# Patient Record
Sex: FEMALE | NOT HISPANIC OR LATINO | Employment: FULL TIME | URBAN - METROPOLITAN AREA
[De-identification: names, ages, dates, MRNs, and addresses within clinical notes are randomized per-mention and may not be internally consistent; named-entity substitution may affect disease eponyms.]

---

## 2020-09-09 NOTE — PROGRESS NOTES
Assessment/Plan:    Discussed TIFFANIE at length with options of consistent kegels, PT and surgical intervention  Patient will consider her options, perform consistent kegels and call the office for visit with Dr Brandon Leblanc if she would like to proceed with surgery  Recommended monthly SBE, annual CBE and annual screening mammo  ASCCP guidelines reviewed and pap with cotesting done today; this low risk patient was advised she meets criteria to d/c pap screening at age 71  Colooguard script completed  Reviewed diet/activity recommendations Calcium 4055-1534 mg and Vit D 600-1000 IU daily  Discussed postmenopausal considerations and symptoms to report  RTO in one year for routine annual gyn exam or sooner PRN  Diagnoses and all orders for this visit:    Encounter for gynecological examination (general) (routine) without abnormal findings    Screening mammogram, encounter for  -     Mammo screening bilateral w 3d & cad; Future    TIFFANIE (stress urinary incontinence, female)        Subjective:      Patient ID: Jamar Cheema is a 63 y o  female  This patient presents for new patient annual gyn exam   She has a hx of TIFFANIE that has been worsening and occurs with every sneeze  She is a runner and notices sx while running, although not as much because of adaptations she has made  She denies UUI  She denies  bleeding or spotting, VM sx, pelvic pain, breast concerns, abnormal discharge, bowel dysfunction, depression/anx  , not sexually active  Patient's  is on many medications for major depression  Pap/HPV done in 2017  Mammography done 8/24/18  Colonoscopy, not done to date  Patient does not like to undergo anesthesia          The following portions of the patient's history were reviewed and updated as appropriate: allergies, current medications, past family history, past medical history, past social history, past surgical history and problem list     Review of Systems   Constitutional: Negative  Respiratory: Negative  Cardiovascular: Negative  Gastrointestinal: Negative  Endocrine: Negative  Genitourinary: Negative for dysuria, frequency, pelvic pain, urgency, vaginal bleeding, vaginal discharge and vaginal pain  Musculoskeletal: Negative  Skin: Negative  Neurological: Negative  Psychiatric/Behavioral: Negative  Objective:      /64 (BP Location: Left arm)   Pulse 64   Ht 5' 2" (1 575 m)   Wt 64 7 kg (142 lb 9 6 oz)   LMP 09/03/2020   BMI 26 08 kg/m²          Physical Exam  Vitals signs and nursing note reviewed  Exam conducted with a chaperone present  Constitutional:       Appearance: Normal appearance  She is well-developed  HENT:      Head: Normocephalic and atraumatic  Neck:      Musculoskeletal: Normal range of motion and neck supple  Thyroid: No thyroid mass or thyromegaly  Cardiovascular:      Rate and Rhythm: Normal rate and regular rhythm  Heart sounds: Normal heart sounds  Pulmonary:      Effort: Pulmonary effort is normal       Breath sounds: Normal breath sounds  Chest:      Breasts: Breasts are symmetrical          Right: No inverted nipple, mass, nipple discharge, skin change or tenderness  Left: No inverted nipple, mass, nipple discharge, skin change or tenderness  Abdominal:      General: Bowel sounds are normal       Palpations: Abdomen is soft  Tenderness: There is no abdominal tenderness  Hernia: There is no hernia in the left inguinal area or right inguinal area  Genitourinary:     General: Normal vulva  Exam position: Supine  Pubic Area: No rash  Labia:         Right: No rash, tenderness, lesion or injury  Left: No rash, tenderness, lesion or injury  Urethra: No prolapse, urethral pain, urethral swelling or urethral lesion  Vagina: Normal  No signs of injury and foreign body   No vaginal discharge, erythema, tenderness, bleeding, lesions or prolapsed vaginal walls  Cervix: No cervical motion tenderness, discharge, friability, lesion, erythema, cervical bleeding or eversion  Uterus: Not deviated, not enlarged, not fixed, not tender and no uterine prolapse  Adnexa:         Right: No mass, tenderness or fullness  Left: No mass, tenderness or fullness  Rectum: No external hemorrhoid  Comments: Urethra normal without lesions  No bladder tenderness   No leak on supine cough stress test, had just emptied bladder  Musculoskeletal: Normal range of motion  Lymphadenopathy:      Lower Body: No right inguinal adenopathy  No left inguinal adenopathy  Skin:     General: Skin is warm and dry  Neurological:      Mental Status: She is alert and oriented to person, place, and time  Psychiatric:         Speech: Speech normal          Behavior: Behavior normal  Behavior is cooperative

## 2020-09-11 ENCOUNTER — OFFICE VISIT (OUTPATIENT)
Dept: GYNECOLOGY | Facility: CLINIC | Age: 55
End: 2020-09-11
Payer: COMMERCIAL

## 2020-09-11 VITALS
HEIGHT: 62 IN | SYSTOLIC BLOOD PRESSURE: 110 MMHG | BODY MASS INDEX: 26.24 KG/M2 | HEART RATE: 64 BPM | DIASTOLIC BLOOD PRESSURE: 64 MMHG | WEIGHT: 142.6 LBS

## 2020-09-11 DIAGNOSIS — Z12.4 ENCOUNTER FOR PAPANICOLAOU SMEAR FOR CERVICAL CANCER SCREENING: ICD-10-CM

## 2020-09-11 DIAGNOSIS — Z01.419 ENCOUNTER FOR GYNECOLOGICAL EXAMINATION (GENERAL) (ROUTINE) WITHOUT ABNORMAL FINDINGS: Primary | ICD-10-CM

## 2020-09-11 DIAGNOSIS — N39.3 SUI (STRESS URINARY INCONTINENCE, FEMALE): ICD-10-CM

## 2020-09-11 DIAGNOSIS — Z12.31 SCREENING MAMMOGRAM, ENCOUNTER FOR: ICD-10-CM

## 2020-09-11 PROCEDURE — 99386 PREV VISIT NEW AGE 40-64: CPT | Performed by: OBSTETRICS & GYNECOLOGY

## 2020-09-11 RX ORDER — FLUTICASONE PROPIONATE 50 MCG
1 SPRAY, SUSPENSION (ML) NASAL DAILY
COMMUNITY

## 2020-09-11 RX ORDER — DIPHENOXYLATE HYDROCHLORIDE AND ATROPINE SULFATE 2.5; .025 MG/1; MG/1
1 TABLET ORAL DAILY
COMMUNITY

## 2020-09-11 NOTE — PATIENT INSTRUCTIONS
Discussed TIFFANIE at length with options of consistent kegels, PT and surgical intervention  Patient will consider her options, perform consistent kegels and call the office for visit with Dr Gabino Loja if she would like to proceed with surgery  Recommended monthly SBE, annual CBE and annual screening mammo  ASCCP guidelines reviewed and pap with cotesting done today; this low risk patient was advised she meets criteria to d/c pap screening at age 71  Colooguard script completed  Reviewed diet/activity recommendations Calcium 0614-2301 mg and Vit D 600-1000 IU daily  Discussed postmenopausal considerations and symptoms to report  RTO in one year for routine annual gyn exam or sooner PRN

## 2020-09-15 LAB
CYTOLOGIST CVX/VAG CYTO: NORMAL
DX ICD CODE: NORMAL
HPV I/H RISK 1 DNA CVX QL PROBE+SIG AMP: NEGATIVE
OTHER STN SPEC: NORMAL
PATH REPORT.FINAL DX SPEC: NORMAL
SL AMB NOTE:: NORMAL
SL AMB SPECIMEN ADEQUACY: NORMAL

## 2020-10-26 ENCOUNTER — NURSE TRIAGE (OUTPATIENT)
Dept: OTHER | Facility: OTHER | Age: 55
End: 2020-10-26

## 2020-10-26 DIAGNOSIS — Z20.822 ENCOUNTER FOR SCREENING LABORATORY TESTING FOR COVID-19 VIRUS: Primary | ICD-10-CM

## 2020-10-26 DIAGNOSIS — Z20.822 ENCOUNTER FOR SCREENING LABORATORY TESTING FOR COVID-19 VIRUS: ICD-10-CM

## 2020-10-26 PROCEDURE — U0003 INFECTIOUS AGENT DETECTION BY NUCLEIC ACID (DNA OR RNA); SEVERE ACUTE RESPIRATORY SYNDROME CORONAVIRUS 2 (SARS-COV-2) (CORONAVIRUS DISEASE [COVID-19]), AMPLIFIED PROBE TECHNIQUE, MAKING USE OF HIGH THROUGHPUT TECHNOLOGIES AS DESCRIBED BY CMS-2020-01-R: HCPCS | Performed by: FAMILY MEDICINE

## 2020-10-27 LAB — SARS-COV-2 RNA SPEC QL NAA+PROBE: NOT DETECTED

## 2020-10-28 ENCOUNTER — TELEPHONE (OUTPATIENT)
Dept: OTHER | Facility: OTHER | Age: 55
End: 2020-10-28

## 2020-10-29 ENCOUNTER — TELEPHONE (OUTPATIENT)
Dept: OTHER | Facility: OTHER | Age: 55
End: 2020-10-29

## 2020-12-06 ENCOUNTER — HOSPITAL ENCOUNTER (OUTPATIENT)
Dept: MAMMOGRAPHY | Facility: IMAGING CENTER | Age: 55
Discharge: HOME | End: 2020-12-06
Payer: COMMERCIAL

## 2020-12-06 VITALS — BODY MASS INDEX: 23.92 KG/M2 | WEIGHT: 135 LBS | HEIGHT: 63 IN

## 2020-12-06 DIAGNOSIS — Z12.31 SCREENING MAMMOGRAM, ENCOUNTER FOR: ICD-10-CM

## 2020-12-06 PROCEDURE — 77063 BREAST TOMOSYNTHESIS BI: CPT

## 2020-12-06 PROCEDURE — 77067 SCR MAMMO BI INCL CAD: CPT

## 2023-04-03 ENCOUNTER — OFFICE VISIT (OUTPATIENT)
Dept: FAMILY MEDICINE CLINIC | Facility: CLINIC | Age: 58
End: 2023-04-03

## 2023-04-03 VITALS
HEIGHT: 63 IN | DIASTOLIC BLOOD PRESSURE: 78 MMHG | SYSTOLIC BLOOD PRESSURE: 122 MMHG | OXYGEN SATURATION: 98 % | HEART RATE: 64 BPM | WEIGHT: 157.25 LBS | BODY MASS INDEX: 27.86 KG/M2 | TEMPERATURE: 97.4 F

## 2023-04-03 DIAGNOSIS — R09.89 ABNORMAL LUNG SOUNDS: ICD-10-CM

## 2023-04-03 DIAGNOSIS — Z23 NEED FOR COVID-19 VACCINE: ICD-10-CM

## 2023-04-03 DIAGNOSIS — Z86.16 HISTORY OF COVID-19: ICD-10-CM

## 2023-04-03 DIAGNOSIS — F41.8 ANXIETY WITH DEPRESSION: Primary | ICD-10-CM

## 2023-04-03 DIAGNOSIS — Z11.4 SCREENING FOR HIV (HUMAN IMMUNODEFICIENCY VIRUS): ICD-10-CM

## 2023-04-03 DIAGNOSIS — R23.2 HOT FLASHES: ICD-10-CM

## 2023-04-03 DIAGNOSIS — G43.009 MIGRAINE WITHOUT AURA AND WITHOUT STATUS MIGRAINOSUS, NOT INTRACTABLE: ICD-10-CM

## 2023-04-03 DIAGNOSIS — Z13.1 SCREENING FOR DIABETES MELLITUS (DM): ICD-10-CM

## 2023-04-03 DIAGNOSIS — J30.9 ALLERGIC RHINITIS, UNSPECIFIED SEASONALITY, UNSPECIFIED TRIGGER: ICD-10-CM

## 2023-04-03 DIAGNOSIS — R63.5 WEIGHT GAIN: ICD-10-CM

## 2023-04-03 DIAGNOSIS — Z12.11 COLON CANCER SCREENING: ICD-10-CM

## 2023-04-03 DIAGNOSIS — Z13.220 LIPID SCREENING: ICD-10-CM

## 2023-04-03 DIAGNOSIS — Z12.31 ENCOUNTER FOR SCREENING MAMMOGRAM FOR MALIGNANT NEOPLASM OF BREAST: ICD-10-CM

## 2023-04-03 DIAGNOSIS — Z11.59 NEED FOR HEPATITIS C SCREENING TEST: ICD-10-CM

## 2023-04-03 PROBLEM — Z86.69 HISTORY OF CHOLESTEATOMA: Status: ACTIVE | Noted: 2018-04-05

## 2023-04-03 PROBLEM — H90.12 CONDUCTIVE HEARING LOSS OF LEFT EAR: Status: ACTIVE | Noted: 2023-04-03

## 2023-04-03 RX ORDER — FLUOXETINE HYDROCHLORIDE 20 MG/1
20 CAPSULE ORAL DAILY
Qty: 90 CAPSULE | Refills: 3 | Status: SHIPPED | OUTPATIENT
Start: 2023-04-03

## 2023-04-03 RX ORDER — RIZATRIPTAN BENZOATE 10 MG/1
TABLET, ORALLY DISINTEGRATING ORAL
COMMUNITY

## 2023-04-03 NOTE — ASSESSMENT & PLAN NOTE
Patient with history of COVID in November presenting with decrease in energy and abnormal left posterior lung fields    Check chest x-ray and pulmonary function test

## 2023-04-03 NOTE — PROGRESS NOTES
Name: Thi Gee      : 1965      MRN: [id number]  Encounter Provider: Flor Rey PA-C  Encounter Date: 4/3/2023   Encounter department: [facility]    Assessment & Plan     1  Anxiety with depression  Assessment & Plan:  PT would like to retry Prozac 20 mg  Recheck in 2 months  Orders:  -     FLUoxetine (PROzac) 20 mg capsule; Take 1 capsule (20 mg total) by mouth daily    2  Migraine without aura and without status migrainosus, not intractable  Assessment & Plan:  NO longer uses Maxalt as very infrequent  3  Allergic rhinitis, unspecified seasonality, unspecified trigger  Assessment & Plan:  Stable on antihistamine and flonase as needed  4  Need for hepatitis C screening test  -     Hepatitis C Antibody ([facility]); Future    5  Screening for HIV (human immunodeficiency virus)  -     HIV 1/2 AG/AB w Reflex [facility] for 2 yr old and above; Future    6  Lipid screening  -     Lipid panel    7  Screening for diabetes mellitus (DM)  -     Comprehensive metabolic panel    8  Weight gain  Assessment & Plan:  Check fasting labs  Orders:  -     Comprehensive metabolic panel  -     TSH, 3rd generation with Free T4 reflex  -     CBC and differential    9  Hot flashes  -     FSH and LH; Future  -     FLUoxetine (PROzac) 20 mg capsule; Take 1 capsule (20 mg total) by mouth daily    10  Abnormal lung sounds  Assessment & Plan:  Patient with history of COVID in November presenting with decrease in energy and abnormal left posterior lung fields  Check chest x-ray and pulmonary function test     Orders:  -     XR chest pa & lateral; Future; Expected date: 2023  -     Complete PFT with post bronchodilator; Future    11  History of COVID-19  -     XR chest pa & lateral; Future; Expected date: 2023  -     Complete PFT with post bronchodilator; Future    12  Colon cancer screening  -     Ambulatory referral for colonoscopy; Future    13   Encounter for screening mammogram for malignant neoplasm of breast  -     Mammo screening bilateral w 3d & cad; Future; Expected date: 04/03/2023    14  Need for COVID-19 vaccine  -     Age 15 y+, BOOSTER (BIVALENT): CTSSW-28 LDHZEY vac bivalent candelaria-sucr      BMI Counseling: Body mass index is 28 3 kg/m²  The BMI is above normal  Nutrition recommendations include decreasing portion sizes, encouraging healthy choices of fruits and vegetables, decreasing fast food intake, consuming healthier snacks, limiting drinks that contain sugar, moderation in carbohydrate intake, increasing intake of lean protein, reducing intake of saturated and trans fat and reducing intake of cholesterol  Exercise recommendations include exercising 3-5 times per week  No pharmacotherapy was ordered  Rationale for BMI follow-up plan is due to patient being overweight or obese  Subjective      Patient here to be established last provider seen was [facility] 2021  Blood work was done at that time and within normal limits except for [facility] in menopausal range  Last mammogram 2020  She was seeing 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  Patient does have a GYN evaluation set up  Also patient has a history of being on Prozac titrated up to 40 mg in the past due to perimenopausal symptoms and it worked very well but then she ended up weaning herself off at this point time she would like to put herself back on this lowest dose    She did not "have a history of weight gain within the past     Patient is interested in getting the shingles vaccine and does need to check her insurance company and is also interested in getting a 5 daily since has been 3 months since her COVID infection  Review of Systems   Constitutional: Negative  HENT: Negative  Eyes: Negative  Respiratory: Negative  Cardiovascular: Negative  Gastrointestinal: Negative  Endocrine: Negative  Genitourinary: Negative  Musculoskeletal: Negative  Skin: Negative  Allergic/Immunologic: Negative  Neurological: Negative  Hematological: Negative  Psychiatric/Behavioral: Negative  Current Outpatient Medications on File Prior to Visit   Medication Sig   • multivitamin (THERAGRAN) TABS Take 1 tablet by mouth daily   • VITAMIN D PO Take by mouth   • Fexofenadine HCl (ALLEGRA PO) Take 10 mg by mouth daily   • fluticasone (FLONASE) 50 mcg/act nasal spray 1 spray into each nostril daily   • rizatriptan (MAXALT-MLT) 10 mg disintegrating tablet Take by mouth       Objective     /78 (BP Location: Right arm, Patient Position: Sitting, Cuff Size: Standard)   Pulse 64   Temp (!) 97 4 °F (36 3 °C) (Temporal)   Ht 5' 2 5\" (1 588 m)   Wt 71 3 kg (157 lb 4 oz)   SpO2 98%   BMI 28 30 kg/m²     Physical Exam  Vitals and nursing note reviewed  Constitutional:       General: She is not in acute distress  Appearance: She is well-developed  She is not diaphoretic  HENT:      Head: Normocephalic and atraumatic  Eyes:      General:         Right eye: No discharge  Left eye: No discharge  Conjunctiva/sclera: Conjunctivae normal    Neck:      Vascular: No carotid bruit  Cardiovascular:      Rate and Rhythm: Normal rate and regular rhythm  Heart sounds: Normal heart sounds  No murmur heard  No friction rub  No gallop  Pulmonary:      Effort: Pulmonary effort is normal  No respiratory distress        Breath sounds: Examination of the " left-upper field reveals wheezing  Examination of the left-middle field reveals wheezing  Examination of the left-lower field reveals wheezing  Wheezing present  No rales  Comments: End inspiratory wheeze left posterior lung fields only  Musculoskeletal:      Cervical back: Neck supple  Skin:     General: Skin is warm and dry  Neurological:      Mental Status: She is alert and oriented to person, place, and time     Psychiatric:         Judgment: Judgment normal        Lexine Koyanagi, PA-C

## 2023-05-02 NOTE — PROGRESS NOTES
AMB US Pelvic Non OB    Date/Time: 5/3/2023 3:00 PM  Performed by: Emily Jameson  Authorized by: Boris Moy DO   Universal Protocol:  Patient identity confirmed: verbally with patient      Procedure details:     Technique:  Transvaginal US, Non-OB    Position: lithotomy exam    Uterine findings:     Length (cm): 5 57    Height (cm):  2 87    Width (cm):  3 87    Endometrial stripe: identified      Endometrium thickness (mm):  2 45  Left ovary findings:     Left ovary:  Visualized    Length (cm): 1 9    Height (cm): 1 09    Width (cm): 1 25  Right ovary findings:     Right ovary:  Visualized    Length (cm): 2 31    Height (cm): 1 05    Width (cm): 1 69  Other findings:     Free pelvic fluid: not identified      Free peritoneal fluid: not identified    Post-Procedure Details:     Impression:  Anteverted uterus and bilateral ovaries appear within normal limits  No free fluid  Tolerance: Tolerated well, no immediate complications    Complications: no complications    Additional Procedure Comments:      GE Voluson P8 transvaginal transducer RIC5-RA with Serial Number [id number] was used during procedure and subsequently cleaned with high level disinfection utilizing the [employer]       Ultrasound performed at:      for [address]  [address], [address]  Phone: [phone]  Fax:  [phone]

## 2023-05-03 ENCOUNTER — OFFICE VISIT (OUTPATIENT)
Dept: GYNECOLOGY | Facility: CLINIC | Age: 58
End: 2023-05-03

## 2023-05-03 ENCOUNTER — ULTRASOUND (OUTPATIENT)
Dept: GYNECOLOGY | Facility: CLINIC | Age: 58
End: 2023-05-03

## 2023-05-03 DIAGNOSIS — N95.0 PMB (POSTMENOPAUSAL BLEEDING): Primary | ICD-10-CM

## 2023-05-03 NOTE — PROGRESS NOTES
Patient was seen in the office on April 12 with Keren Calixto for annual examination  Patient stated she had a history of stress urinary incontinence that has progressively become worse  She denies any frequency or dysuria or hematuria  She also reported an episode of bleeding in Christmas 2022  She has had no further bleeding since then  She was advised to return to the office for transvaginal scan possible saline infusion hysterosonography possible biopsy       Date/Time: 5/3/2023 3:00 PM  Performed by: Michelle Gipson  Authorized by: Rivera Marc DO   Universal Protocol:  Patient identity confirmed: verbally with patient        Procedure details:     Technique:  Transvaginal US, Non-OB    Position: lithotomy exam    Uterine findings:     Length (cm): 5 57    Height (cm):  2 87    Width (cm):  3 87    Endometrial stripe: identified      Endometrium thickness (mm):  2 45  Left ovary findings:     Left ovary:  Visualized    Length (cm): 1 9    Height (cm): 1 09    Width (cm): 1 25  Right ovary findings:     Right ovary:  Visualized    Length (cm): 2 31    Height (cm): 1 05    Width (cm): 1 69  Other findings:     Free pelvic fluid: not identified      Free peritoneal fluid: not identified    Post-Procedure Details:     Impression:  Anteverted uterus and bilateral ovaries appear within normal limits  No free fluid  Tolerance: Tolerated well, no immediate complications    Complications: no complications    Additional Procedure Comments:       Impression: Postmenopausal bleeding  Endometrium less than 3 mm therefore no biopsy nor saline infusion was performed       TIFFANIE    Plan: Patient will return to the office to further discuss treatment options for TIFFANIE

## 2023-05-11 ENCOUNTER — HOSPITAL ENCOUNTER (OUTPATIENT)
Dept: MAMMOGRAPHY | Facility: MEDICAL CENTER | Age: 58
Discharge: HOME | End: 2023-05-11

## 2023-05-11 VITALS — BODY MASS INDEX: 27.66 KG/M2 | WEIGHT: 156.09 LBS | HEIGHT: 63 IN

## 2023-05-11 DIAGNOSIS — Z12.31 ENCOUNTER FOR SCREENING MAMMOGRAM FOR MALIGNANT NEOPLASM OF BREAST: ICD-10-CM

## 2023-05-19 ENCOUNTER — OFFICE VISIT (OUTPATIENT)
Dept: GASTROENTEROLOGY | Facility: MEDICAL CENTER | Age: 58
End: 2023-05-19

## 2023-05-19 VITALS
TEMPERATURE: 98.2 F | DIASTOLIC BLOOD PRESSURE: 76 MMHG | WEIGHT: 155.2 LBS | SYSTOLIC BLOOD PRESSURE: 130 MMHG | HEART RATE: 68 BPM | HEIGHT: 63 IN | BODY MASS INDEX: 27.5 KG/M2

## 2023-05-19 DIAGNOSIS — Z12.11 COLON CANCER SCREENING: ICD-10-CM

## 2023-05-19 RX ORDER — SODIUM, POTASSIUM,MAG SULFATES 17.5-3.13G
177 SOLUTION, RECONSTITUTED, ORAL ORAL SEE ADMIN INSTRUCTIONS
Qty: 354 ML | Refills: 0 | Status: SHIPPED | OUTPATIENT
Start: 2023-05-19

## 2023-05-19 NOTE — PATIENT INSTRUCTIONS
Scheduled date of Colonoscopy (as of today) 06/23/2023  Physician performing: Dr Coates Setting  Location of procedure:   [place]  Instructions given to patient:  Suprep  Clearances: n/a

## 2023-05-19 NOTE — PROGRESS NOTES
[address] [facility] - Outpatient Consultation  Sunshine Caal 60 y o  female MRN: [id number]  Encounter: [id number]      Assessment and Plan:    1  Colon cancer screening        60 y o  female without significant medical history referred to GI for initial screening colonoscopy  She is at average risk for CRC  She asked about Cologuard vs colonoscopy, and we discussed the risks and benefits for each  Ultimately, she will proceed with colonoscopy  - Colonoscopy, Suprep    Orders Placed This Encounter   Procedures   • Colonoscopy     ______________________________________________________________________    History of Present Illness:    Sunshine Caal is a 60 y o  female without significant medical history referred to GI for initial screening colonoscopy  Patient denies abdominal pain, diarrhea, constipation, hematochezia, melena, or unintentional weight loss  No family history of colon cancer  No prior colonoscopy but she had a negative Cologuard in 11/2020  Review of Systems:  As per HPI  Otherwise negative        Historical Information   Past Medical History:   Diagnosis Date   • Allergic      Past Surgical History:   Procedure Laterality Date   • TYMPANOPLASTY W/ MASTOIDECTOMY       Social History   Social History     Substance and Sexual Activity   Alcohol Use Yes    Comment: socially     Social History     Substance and Sexual Activity   Drug Use Never     Social History     Tobacco Use   Smoking Status Never   Smokeless Tobacco Never     Family History   Problem Relation Age of Onset   • Heart disease Father         triple bypass   • No Known Problems Sister    • No Known Problems Brother    • No Known Problems Mother    • No Known Problems Maternal Grandmother    • No Known Problems Maternal Grandfather    • No Known Problems Paternal Grandmother    • No Known Problems Paternal Grandfather    • No Known Problems Sister    • No Known Problems Sister    • No Known "Problems Sister    • No Known Problems Maternal Aunt    • No Known Problems Maternal Aunt    • No Known Problems Maternal Aunt    • No Known Problems Maternal Aunt        Meds/Allergies       Current Outpatient Medications:   •  Fexofenadine HCl (ALLEGRA PO)  •  FLUoxetine (PROzac) 20 mg capsule  •  multivitamin (THERAGRAN) TABS  •  Na Sulfate-K Sulfate-Mg Sulf (Suprep Bowel Prep Kit) 17 5-3 13-1 6 GM/177ML SOLN  •  VITAMIN D PO  •  fluticasone (FLONASE) 50 mcg/act nasal spray  •  rizatriptan (MAXALT-MLT) 10 mg disintegrating tablet    Allergies   Allergen Reactions   • Penicillins Rash   • Pollen Extract Allergic Rhinitis           Objective     Blood pressure 130/76, pulse 68, temperature 98 2 °F (36 8 °C), temperature source Tympanic, height 5' 2 5\" (1 588 m), weight 70 4 kg (155 lb 3 2 oz), last menstrual period 10/31/2020  Body mass index is 27 93 kg/m²          Physical Exam:      General: No acute distress  Abdomen: Soft, non-tender, non-distended, normoactive bowel sounds  Neuro: Awake, alert, oriented x 3    Lab Results:   No results found for: WBC, HGB, PLT, SODIUM, K, CL, CO2, BUN, CREATININE, AST, ALT, ALKPHOS, TBILI, BILIDIR, ALB, INR, LIPASE, FERRITIN, CONCFE, TIBC    "

## 2023-06-06 ENCOUNTER — TELEPHONE (OUTPATIENT)
Dept: GASTROENTEROLOGY | Facility: MEDICAL CENTER | Age: 58
End: 2023-06-06

## 2023-06-08 DIAGNOSIS — Z12.11 SCREENING FOR COLON CANCER: Primary | ICD-10-CM

## 2023-06-08 RX ORDER — BISACODYL 5 MG/1
TABLET, DELAYED RELEASE ORAL
Qty: 2 TABLET | Refills: 0 | Status: SHIPPED | OUTPATIENT
Start: 2023-06-08

## 2023-06-08 NOTE — TELEPHONE ENCOUNTER
I called and spoke with patient to confirm 6/23 procedure  Patient informed of prep medication sent to pharmacy and of prep instructions being sent to [facility]  Patient requested a different bowel prep medication as Suprep was too expensive at pharmacy

## 2023-06-21 RX ORDER — SODIUM CHLORIDE 9 MG/ML
125 INJECTION, SOLUTION INTRAVENOUS CONTINUOUS
Status: CANCELLED | OUTPATIENT
Start: 2023-06-21

## 2023-06-22 ENCOUNTER — ANESTHESIA (OUTPATIENT)
Dept: ANESTHESIOLOGY | Facility: HOSPITAL | Age: 58
End: 2023-06-22

## 2023-06-22 ENCOUNTER — ANESTHESIA EVENT (OUTPATIENT)
Dept: ANESTHESIOLOGY | Facility: HOSPITAL | Age: 58
End: 2023-06-22

## 2023-06-22 NOTE — ANESTHESIA PREPROCEDURE EVALUATION
Procedure:  PRE-OP ONLY    Relevant Problems   ANESTHESIA (within normal limits)      CARDIO (within normal limits)   (+) Migraine without aura and without status migrainosus, not intractable      ENDO (within normal limits)      GI/HEPATIC (within normal limits)      /RENAL (within normal limits)      GYN (within normal limits)      HEMATOLOGY (within normal limits)      MUSCULOSKELETAL (within normal limits)      NEURO/PSYCH   (+) Anxiety with depression   (+) Migraine without aura and without status migrainosus, not intractable      PULMONARY (within normal limits)             Anesthesia Plan  ASA Score- 2     Anesthesia Type- IV sedation with anesthesia with ASA Monitors  Additional Monitors:   Airway Plan:           Plan Factors-Exercise tolerance (METS): >4 METS  Chart reviewed  Patient summary reviewed  Patient is not a current smoker  Induction- intravenous  Postoperative Plan-     Informed Consent- Anesthetic plan and risks discussed with patient

## 2023-06-23 ENCOUNTER — ANESTHESIA (OUTPATIENT)
Dept: GASTROENTEROLOGY | Facility: MEDICAL CENTER | Age: 58
End: 2023-06-23

## 2023-06-23 ENCOUNTER — HOSPITAL ENCOUNTER (OUTPATIENT)
Dept: GASTROENTEROLOGY | Facility: MEDICAL CENTER | Age: 58
Setting detail: OUTPATIENT SURGERY
End: 2023-06-23
Payer: COMMERCIAL

## 2023-06-23 ENCOUNTER — ANESTHESIA EVENT (OUTPATIENT)
Dept: GASTROENTEROLOGY | Facility: MEDICAL CENTER | Age: 58
End: 2023-06-23

## 2023-06-23 VITALS
SYSTOLIC BLOOD PRESSURE: 111 MMHG | DIASTOLIC BLOOD PRESSURE: 57 MMHG | TEMPERATURE: 98.5 F | OXYGEN SATURATION: 98 % | RESPIRATION RATE: 16 BRPM | HEART RATE: 59 BPM

## 2023-06-23 DIAGNOSIS — Z12.11 COLON CANCER SCREENING: ICD-10-CM

## 2023-06-23 RX ORDER — PROPOFOL 10 MG/ML
INJECTION, EMULSION INTRAVENOUS AS NEEDED
Status: DISCONTINUED | OUTPATIENT
Start: 2023-06-23 | End: 2023-06-23

## 2023-06-23 RX ORDER — LIDOCAINE HYDROCHLORIDE 20 MG/ML
INJECTION, SOLUTION EPIDURAL; INFILTRATION; INTRACAUDAL; PERINEURAL AS NEEDED
Status: DISCONTINUED | OUTPATIENT
Start: 2023-06-23 | End: 2023-06-23

## 2023-06-23 RX ORDER — SODIUM CHLORIDE 9 MG/ML
125 INJECTION, SOLUTION INTRAVENOUS CONTINUOUS
Status: DISCONTINUED | OUTPATIENT
Start: 2023-06-23 | End: 2023-06-27 | Stop reason: HOSPADM

## 2023-06-23 RX ADMIN — PROPOFOL 100 MG: 10 INJECTION, EMULSION INTRAVENOUS at 12:21

## 2023-06-23 RX ADMIN — LIDOCAINE HYDROCHLORIDE 80 MG: 20 INJECTION, SOLUTION EPIDURAL; INFILTRATION; INTRACAUDAL at 12:21

## 2023-06-23 RX ADMIN — SODIUM CHLORIDE 125 ML/HR: 0.9 INJECTION, SOLUTION INTRAVENOUS at 11:55

## 2023-06-23 RX ADMIN — PROPOFOL 50 MG: 10 INJECTION, EMULSION INTRAVENOUS at 12:26

## 2023-06-23 NOTE — ANESTHESIA PREPROCEDURE EVALUATION
Procedure:  COLONOSCOPY    Relevant Problems   ANESTHESIA (within normal limits)      CARDIO (within normal limits)   (+) Migraine without aura and without status migrainosus, not intractable      ENDO (within normal limits)      GI/HEPATIC (within normal limits)      /RENAL (within normal limits)      GYN (within normal limits)      HEMATOLOGY (within normal limits)      MUSCULOSKELETAL (within normal limits)      NEURO/PSYCH   (+) Anxiety with depression   (+) Migraine without aura and without status migrainosus, not intractable      PULMONARY (within normal limits)        Physical Exam    Airway    Mallampati score: II  TM Distance: >3 FB  Neck ROM: full     Dental   No notable dental hx     Cardiovascular  Rhythm: regular, Rate: normal, Cardiovascular exam normal    Pulmonary  Pulmonary exam normal Breath sounds clear to auscultation,     Other Findings        Anesthesia Plan  ASA Score- 2     Anesthesia Type- IV sedation with anesthesia with ASA Monitors  Additional Monitors:   Airway Plan:           Plan Factors-Exercise tolerance (METS): >4 METS  Chart reviewed  Patient summary reviewed  Patient is not a current smoker  Induction- intravenous  Postoperative Plan-     Informed Consent- Anesthetic plan and risks discussed with patient

## 2023-06-23 NOTE — H&P
History and Physical -  [facility]  Leslie Monson 60 y o  female MRN: [id number]          HPI: Leslie Monson is a 60y o  year old female who presents for initial screening colonoscopy  No prior colonoscopy although Cologuard 11/20202 was negative  No family history of colon cancer  REVIEW OF SYSTEMS: Per the HPI, and otherwise unremarkable      Historical Information   Past Medical History:   Diagnosis Date   • Allergic      Past Surgical History:   Procedure Laterality Date   • TYMPANOPLASTY W/ MASTOIDECTOMY       Social History   Social History     Substance and Sexual Activity   Alcohol Use Yes    Comment: socially     Social History     Substance and Sexual Activity   Drug Use Never     Social History     Tobacco Use   Smoking Status Never   Smokeless Tobacco Never     Family History   Problem Relation Age of Onset   • Heart disease Father         triple bypass   • No Known Problems Sister    • No Known Problems Brother    • No Known Problems Mother    • No Known Problems Maternal Grandmother    • No Known Problems Maternal Grandfather    • No Known Problems Paternal Grandmother    • No Known Problems Paternal Grandfather    • No Known Problems Sister    • No Known Problems Sister    • No Known Problems Sister    • No Known Problems Maternal Aunt    • No Known Problems Maternal Aunt    • No Known Problems Maternal Aunt    • No Known Problems Maternal Aunt        Meds/Allergies       Current Outpatient Medications:   •  bisacodyl (DULCOLAX) 5 mg EC tablet  •  Fexofenadine HCl (ALLEGRA PO)  •  FLUoxetine (PROzac) 20 mg capsule  •  fluticasone (FLONASE) 50 mcg/act nasal spray  •  multivitamin (THERAGRAN) TABS  •  Na Sulfate-K Sulfate-Mg Sulf (Suprep Bowel Prep Kit) 17 5-3 13-1 6 GM/177ML SOLN  •  polyethylene glycol (GOLYTELY) 4000 mL solution  •  rizatriptan (MAXALT-MLT) 10 mg disintegrating tablet  •  VITAMIN D PO    Current Facility-Administered Medications:   •  sodium chloride 0 9 % infusion, 125 mL/hr, Intravenous, Continuous    Allergies   Allergen Reactions   • Penicillins Rash   • Pollen Extract Allergic Rhinitis       Objective     LMP 10/31/2020 (Approximate) Comment: skipping periods, denies pregnancy      PHYSICAL EXAM    GEN: NAD  CARDIO: RRR  PULM: CTA bilaterally  ABD: soft, non-tender, non-distended  EXT: no lower extremity edema  NEURO: AAOx3      ASSESSMENT/PLAN:  60y o  year old female here for colonoscopy; she is stable and optimized for her procedure

## 2023-06-23 NOTE — ANESTHESIA POSTPROCEDURE EVALUATION
Post-Op Assessment Note    CV Status:  Stable    Pain management: adequate     Mental Status:  Alert and awake   Hydration Status:  Euvolemic   PONV Controlled:  Controlled   Airway Patency:  Patent      Post Op Vitals Reviewed: Yes      Staff: Anesthesiologist         No notable events documented      /57 (06/23/23 1248)    Temp      Pulse 59 (06/23/23 1248)   Resp 16 (06/23/23 1248)    SpO2 98 % (06/23/23 1248)

## 2024-02-22 ENCOUNTER — OFFICE VISIT (OUTPATIENT)
Dept: URGENT CARE | Facility: CLINIC | Age: 59
End: 2024-02-22
Payer: COMMERCIAL

## 2024-02-22 VITALS
TEMPERATURE: 96.6 F | SYSTOLIC BLOOD PRESSURE: 142 MMHG | RESPIRATION RATE: 16 BRPM | OXYGEN SATURATION: 98 % | DIASTOLIC BLOOD PRESSURE: 74 MMHG | HEART RATE: 64 BPM

## 2024-02-22 DIAGNOSIS — H00.012 HORDEOLUM EXTERNUM OF RIGHT LOWER EYELID: Primary | ICD-10-CM

## 2024-02-22 PROCEDURE — 99213 OFFICE O/P EST LOW 20 MIN: CPT

## 2024-02-22 RX ORDER — ERYTHROMYCIN 5 MG/G
0.5 OINTMENT OPHTHALMIC
Qty: 3.5 G | Refills: 0 | Status: SHIPPED | OUTPATIENT
Start: 2024-02-22 | End: 2024-02-29

## 2024-02-22 NOTE — PATIENT INSTRUCTIONS
Warm soaks to the right eye 4-6 times a day  Use the eye ointment as prescribed  If your eyes not any better after the weekend follow-up with your ophthalmologist

## 2024-02-22 NOTE — PROGRESS NOTES
[facility]        NAME: Mirta Sierra is a 58 y.o. female  : 1965    MRN: [id number]  DATE: 2024  TIME: 8:39 AM    Assessment and Plan   Hordeolum externum of right lower eyelid [H00.012]  1. Hordeolum externum of right lower eyelid  erythromycin (ILOTYCIN) ophthalmic ointment            Patient Instructions   Warm soaks to the right eye 4-6 times a day  Use the eye ointment as prescribed  If your eyes not any better after the weekend follow-up with your ophthalmologist    Follow up with PCP in 3-5 days.  Proceed to  ER if symptoms worsen.    Chief Complaint     Chief Complaint   Patient presents with    Eye Pain     Pt reports right eye pain since this weekend. Feels like she has a stye and believes this was caused by getting sawdust in her eye.          History of Present Illness       This is a 58-year-old female who presents today with a stye on her lower right eyelid.  She states over the weekend she was working with sawdust and feels like she got some dust in her eye but rinsed it out.  Monday she felt a little bit of swelling in her lower eyelid.  She denies any drainage or visual changes.  She does have slight swelling under her right lower lid    Eye Pain         Review of Systems   Review of Systems   Eyes:  Positive for pain.         Current Medications       Current Outpatient Medications:     erythromycin (ILOTYCIN) ophthalmic ointment, Administer 0.5 inches to the right eye daily at bedtime for 7 days, Disp: 3.5 g, Rfl: 0    Fexofenadine HCl (ALLEGRA PO), Take 10 mg by mouth daily, Disp: , Rfl:     FLUoxetine (PROzac) 20 mg capsule, Take 1 capsule (20 mg total) by mouth daily, Disp: 90 capsule, Rfl: 3    fluticasone (FLONASE) 50 mcg/act nasal spray, 1 spray into each nostril daily (Patient not taking: Reported on 2023), Disp: , Rfl:     multivitamin (THERAGRAN) TABS, Take 1 tablet by mouth daily, Disp: , Rfl:     rizatriptan (MAXALT-MLT) 10 mg disintegrating  tablet, Take by mouth (Patient not taking: Reported on 4/12/2023), Disp: , Rfl:     VITAMIN D PO, Take by mouth, Disp: , Rfl:     Current Allergies     Allergies as of 02/22/2024 - Reviewed 02/22/2024   Allergen Reaction Noted    Penicillins Rash 10/15/2015    Pollen extract Allergic Rhinitis 09/11/2020            The following portions of the patient's history were reviewed and updated as appropriate: allergies, current medications, past family history, past medical history, past social history, past surgical history and problem list.     Past Medical History:   Diagnosis Date    Allergic        Past Surgical History:   Procedure Laterality Date    TYMPANOPLASTY W/ MASTOIDECTOMY         Family History   Problem Relation Age of Onset    Heart disease Father         triple bypass    No Known Problems Sister     No Known Problems Brother     No Known Problems Mother     No Known Problems Maternal Grandmother     No Known Problems Maternal Grandfather     No Known Problems Paternal Grandmother     No Known Problems Paternal Grandfather     No Known Problems Sister     No Known Problems Sister     No Known Problems Sister     No Known Problems Maternal Aunt     No Known Problems Maternal Aunt     No Known Problems Maternal Aunt     No Known Problems Maternal Aunt          Medications have been verified.        Objective   /74   Pulse 64   Temp (!) 96.6 °F (35.9 °C)   Resp 16   LMP 10/31/2020 (Approximate) Comment: skipping periods, denies pregnancy  SpO2 98%   Patient's last menstrual period was 10/31/2020 (approximate).       Physical Exam     Physical Exam               Mouth: Mucous membranes are dry.   Eyes:      General:         Right eye: Discharge present.      Conjunctiva/sclera: Conjunctivae normal.      Pupils: Pupils are equal, round, and reactive to light.        Comments: Sty noted on lower R eye lid   Cardiovascular:      Rate and Rhythm: Normal rate and regular rhythm.      Pulses: Normal pulses.      Heart sounds: Normal heart sounds.   Pulmonary:      Effort: Pulmonary effort is normal.      Breath sounds: Normal breath sounds.   Abdominal:      General: Abdomen is flat. Bowel sounds are normal.   Musculoskeletal:         General: Normal range of motion.      Cervical back: Normal range of motion and neck supple.   Skin:     General: Skin is warm and dry.      Capillary Refill: Capillary refill takes less than 2 seconds.   Neurological:      General: No focal deficit present.      Mental Status: She is alert and oriented to person, place, and time. Mental status is at baseline.   Psychiatric:         Mood and Affect: Mood normal.         Thought Content: Thought content normal.         Judgment: Judgment normal.

## 2025-03-26 ENCOUNTER — TELEPHONE (OUTPATIENT)
Dept: FAMILY MEDICINE CLINIC | Facility: CLINIC | Age: 60
End: 2025-03-26

## 2025-03-26 NOTE — TELEPHONE ENCOUNTER
On 2/25/2025 patient established care with Sarai Mac MD   [address] [facility] [address]   [place] [address]   Phone: tel:[phone]   fax:[phone]

## 2025-03-29 NOTE — TELEPHONE ENCOUNTER
03/28/25 11:48 PM        The office's request has been received, reviewed, and the patient chart updated. The PCP has successfully been removed with a patient attribution note. This message will now be completed.        Thank you  Sharon Yen

## 2025-06-11 ENCOUNTER — OFFICE VISIT (OUTPATIENT)
Dept: URGENT CARE | Facility: CLINIC | Age: 60
End: 2025-06-11
Payer: COMMERCIAL

## 2025-06-11 VITALS
OXYGEN SATURATION: 98 % | TEMPERATURE: 97.9 F | DIASTOLIC BLOOD PRESSURE: 70 MMHG | HEART RATE: 73 BPM | RESPIRATION RATE: 18 BRPM | SYSTOLIC BLOOD PRESSURE: 132 MMHG

## 2025-06-11 DIAGNOSIS — L25.9 CONTACT DERMATITIS, UNSPECIFIED CONTACT DERMATITIS TYPE, UNSPECIFIED TRIGGER: Primary | ICD-10-CM

## 2025-06-11 PROCEDURE — G0382 LEV 3 HOSP TYPE B ED VISIT: HCPCS

## 2025-06-11 PROCEDURE — S9083 URGENT CARE CENTER GLOBAL: HCPCS

## 2025-06-11 RX ORDER — PREDNISONE 10 MG/1
TABLET ORAL
Qty: 15 TABLET | Refills: 0 | Status: SHIPPED | OUTPATIENT
Start: 2025-06-11 | End: 2025-06-16

## 2025-06-11 NOTE — PROGRESS NOTES
[facility]        NAME: Mirta Sierra is a 60 y.o. female  : 1965    MRN: [id number]  DATE: 2025  TIME: 4:45 PM    Assessment and Plan   Contact dermatitis, unspecified contact dermatitis type, unspecified trigger [L25.9]  1. Contact dermatitis, unspecified contact dermatitis type, unspecified trigger  predniSONE 10 mg tablet            Patient Instructions     Take prednisone as prescribed.    May use Benadryl 25 mg - 50 mg every 4-6 hours as needed for itching. Be careful of drowsiness, do not take before driving or operating heavy machinery.     Use cool compresses, oatmeal baths, calamine lotion, and emollients such as Aveeno oatmeal for eczema as needed for comfort.     Use unscented/sensitive formula soaps, lotions, and detergents. Limit hot showers.     Follow up with your PCP in 7-10 days for any persistent symptoms.      Go to the ER if symptoms significantly worsen.     If tests are performed, our office will contact you with results only if changes need to made to the care plan discussed with you at the visit. [facility]      Chief Complaint     Chief Complaint   Patient presents with    Rash     Patient started with a rash yesterday on her chest that is now spreading down her arms         History of Present Illness       60-year-old female who presents for evaluation of a red, patchy, itchy rash to her left upper chest and right forearm.  Patient states she first noticed the rash yesterday, feels like it is spreading.  Spends a lot of time outdoors, unsure if contact with poison ivy.  No new skin care products, soaps, detergents, medications, foods, and exposures to new plants or animals.  Denies associated symptoms including fever, myalgias, and joint swelling.  Took Benadryl this morning for itch relief, does not feel like it was that helpful.        Review of Systems   Review of Systems   Skin:  Positive for rash.          Current Medications     Current Medications[1]    Current Allergies     Allergies as of 06/11/2025 - Reviewed 06/11/2025   Allergen Reaction Noted    Penicillins Rash 10/15/2015    Pollen extract Allergic Rhinitis 09/11/2020            The following portions of the patient's history were reviewed and updated as appropriate: allergies, current medications, past family history, past medical history, past social history, past surgical history and problem list.     Past Medical History[2]    Past Surgical History[3]    Family History[4]      Medications have been verified.        Objective   /70   Pulse 73   Temp 97.9 °F (36.6 °C)   Resp 18   LMP 10/31/2020 (Approximate) Comment: skipping periods, denies pregnancy  SpO2 98%        Physical Exam     Physical Exam  Vitals and nursing note reviewed.   Constitutional:       General: She is not in acute distress.     Appearance: She is not ill-appearing.   HENT:      Head: Normocephalic and atraumatic.      Mouth/Throat:      Mouth: Mucous membranes are moist.      Pharynx: Oropharynx is clear.     Eyes:      Conjunctiva/sclera: Conjunctivae normal.       Cardiovascular:      Rate and Rhythm: Normal rate and regular rhythm.      Pulses: Normal pulses.      Heart sounds: Normal heart sounds.   Pulmonary:      Effort: Pulmonary effort is normal.      Breath sounds: Normal breath sounds.     Musculoskeletal:         General: Normal range of motion.      Cervical back: Normal range of motion and neck supple.     Skin:     General: Skin is warm and dry.      Capillary Refill: Capillary refill takes less than 2 seconds.      Findings: Erythema and rash present.     Neurological:      Mental Status: She is alert and oriented to person, place, and time.                        [1]   Current Outpatient Medications:     predniSONE 10 mg tablet, Take 5 tablets (50 mg total) by mouth daily for 1 day, THEN 4 tablets (40 mg total) daily for 1 day, THEN 3 tablets (30 mg  total) daily for 1 day, THEN 2 tablets (20 mg total) daily for 1 day, THEN 1 tablet (10 mg total) daily for 1 day., Disp: 15 tablet, Rfl: 0    Fexofenadine HCl (ALLEGRA PO), Take 10 mg by mouth daily, Disp: , Rfl:     FLUoxetine (PROzac) 20 mg capsule, Take 1 capsule (20 mg total) by mouth daily, Disp: 90 capsule, Rfl: 3    fluticasone (FLONASE) 50 mcg/act nasal spray, 1 spray into each nostril daily (Patient not taking: Reported on 4/12/2023), Disp: , Rfl:     multivitamin (THERAGRAN) TABS, Take 1 tablet by mouth daily, Disp: , Rfl:     rizatriptan (MAXALT-MLT) 10 mg disintegrating tablet, Take by mouth (Patient not taking: Reported on 4/12/2023), Disp: , Rfl:     VITAMIN D PO, Take by mouth, Disp: , Rfl:   [2]   Past Medical History:  Diagnosis Date    Allergic    [3]   Past Surgical History:  Procedure Laterality Date    TYMPANOPLASTY W/ MASTOIDECTOMY     [4]   Family History  Problem Relation Name Age of Onset    Heart disease Father          triple bypass    No Known Problems Sister      No Known Problems Brother      No Known Problems Mother      No Known Problems Maternal Grandmother      No Known Problems Maternal Grandfather      No Known Problems Paternal Grandmother      No Known Problems Paternal Grandfather      No Known Problems Sister      No Known Problems Sister      No Known Problems Sister      No Known Problems Maternal Aunt      No Known Problems Maternal Aunt      No Known Problems Maternal Aunt      No Known Problems Maternal Aunt

## 2025-06-11 NOTE — PATIENT INSTRUCTIONS
Take prednisone as prescribed.    May use Benadryl 25 mg - 50 mg every 4-6 hours as needed for itching. Be careful of drowsiness, do not take before driving or operating heavy machinery.     Use cool compresses, oatmeal baths, calamine lotion, and emollients such as Aveeno oatmeal for eczema as needed for comfort.     Use unscented/sensitive formula soaps, lotions, and detergents. Limit hot showers.     Follow up with your PCP in 7-10 days for any persistent symptoms.      Go to the ER if symptoms significantly worsen.